# Patient Record
Sex: FEMALE | Race: WHITE | NOT HISPANIC OR LATINO | URBAN - METROPOLITAN AREA
[De-identification: names, ages, dates, MRNs, and addresses within clinical notes are randomized per-mention and may not be internally consistent; named-entity substitution may affect disease eponyms.]

---

## 2018-09-25 ENCOUNTER — INPATIENT (INPATIENT)
Facility: HOSPITAL | Age: 58
LOS: 2 days | Discharge: LEFT BEFORE DISCHARGE | End: 2018-09-28
Attending: INTERNAL MEDICINE | Admitting: INTERNAL MEDICINE

## 2018-09-25 VITALS
HEART RATE: 74 BPM | HEIGHT: 62 IN | WEIGHT: 104.94 LBS | TEMPERATURE: 95 F | RESPIRATION RATE: 14 BRPM | DIASTOLIC BLOOD PRESSURE: 64 MMHG | SYSTOLIC BLOOD PRESSURE: 104 MMHG

## 2018-09-25 DIAGNOSIS — F13.20 SEDATIVE, HYPNOTIC OR ANXIOLYTIC DEPENDENCE, UNCOMPLICATED: ICD-10-CM

## 2018-09-25 DIAGNOSIS — F41.9 ANXIETY DISORDER, UNSPECIFIED: ICD-10-CM

## 2018-09-25 DIAGNOSIS — F17.200 NICOTINE DEPENDENCE, UNSPECIFIED, UNCOMPLICATED: ICD-10-CM

## 2018-09-25 DIAGNOSIS — F19.239 OTHER PSYCHOACTIVE SUBSTANCE DEPENDENCE WITH WITHDRAWAL, UNSPECIFIED: ICD-10-CM

## 2018-09-25 DIAGNOSIS — B19.20 UNSPECIFIED VIRAL HEPATITIS C WITHOUT HEPATIC COMA: ICD-10-CM

## 2018-09-25 DIAGNOSIS — F11.20 OPIOID DEPENDENCE, UNCOMPLICATED: ICD-10-CM

## 2018-09-25 LAB
ALBUMIN SERPL ELPH-MCNC: 4 G/DL — SIGNIFICANT CHANGE UP (ref 3.5–5.2)
ALP SERPL-CCNC: 65 U/L — SIGNIFICANT CHANGE UP (ref 30–115)
ALT FLD-CCNC: 43 U/L — HIGH (ref 0–41)
ANION GAP SERPL CALC-SCNC: 11 MMOL/L — SIGNIFICANT CHANGE UP (ref 7–14)
APPEARANCE UR: CLEAR — SIGNIFICANT CHANGE UP
AST SERPL-CCNC: 40 U/L — SIGNIFICANT CHANGE UP (ref 0–41)
BACTERIA # UR AUTO: ABNORMAL
BASOPHILS # BLD AUTO: 0.03 K/UL — SIGNIFICANT CHANGE UP (ref 0–0.2)
BASOPHILS NFR BLD AUTO: 0.7 % — SIGNIFICANT CHANGE UP (ref 0–1)
BILIRUB SERPL-MCNC: 0.4 MG/DL — SIGNIFICANT CHANGE UP (ref 0.2–1.2)
BILIRUB UR-MCNC: NEGATIVE — SIGNIFICANT CHANGE UP
BUN SERPL-MCNC: 18 MG/DL — SIGNIFICANT CHANGE UP (ref 10–20)
CALCIUM SERPL-MCNC: 8.7 MG/DL — SIGNIFICANT CHANGE UP (ref 8.5–10.1)
CHLORIDE SERPL-SCNC: 108 MMOL/L — SIGNIFICANT CHANGE UP (ref 98–110)
CHOLEST SERPL-MCNC: 95 MG/DL — LOW (ref 100–200)
CO2 SERPL-SCNC: 23 MMOL/L — SIGNIFICANT CHANGE UP (ref 17–32)
COD CRY URNS QL: NEGATIVE — SIGNIFICANT CHANGE UP
COLOR SPEC: YELLOW — SIGNIFICANT CHANGE UP
CREAT SERPL-MCNC: 0.7 MG/DL — SIGNIFICANT CHANGE UP (ref 0.7–1.5)
DIFF PNL FLD: ABNORMAL
EOSINOPHIL # BLD AUTO: 0.08 K/UL — SIGNIFICANT CHANGE UP (ref 0–0.7)
EOSINOPHIL NFR BLD AUTO: 1.9 % — SIGNIFICANT CHANGE UP (ref 0–8)
EPI CELLS # UR: ABNORMAL /HPF
GGT SERPL-CCNC: 19 U/L — SIGNIFICANT CHANGE UP (ref 1–40)
GLUCOSE SERPL-MCNC: 60 MG/DL — LOW (ref 70–99)
GLUCOSE UR QL: NEGATIVE MG/DL — SIGNIFICANT CHANGE UP
GRAN CASTS # UR COMP ASSIST: NEGATIVE — SIGNIFICANT CHANGE UP
HCG UR QL: NEGATIVE — SIGNIFICANT CHANGE UP
HCT VFR BLD CALC: 37.7 % — SIGNIFICANT CHANGE UP (ref 37–47)
HDLC SERPL-MCNC: 32 MG/DL — LOW
HGB BLD-MCNC: 12.3 G/DL — SIGNIFICANT CHANGE UP (ref 12–16)
HYALINE CASTS # UR AUTO: NEGATIVE — SIGNIFICANT CHANGE UP
IMM GRANULOCYTES NFR BLD AUTO: 0.2 % — SIGNIFICANT CHANGE UP (ref 0.1–0.3)
KETONES UR-MCNC: NEGATIVE — SIGNIFICANT CHANGE UP
LEUKOCYTE ESTERASE UR-ACNC: NEGATIVE — SIGNIFICANT CHANGE UP
LIPID PNL WITH DIRECT LDL SERPL: 54 MG/DL — SIGNIFICANT CHANGE UP (ref 4–129)
LYMPHOCYTES # BLD AUTO: 1.67 K/UL — SIGNIFICANT CHANGE UP (ref 1.2–3.4)
LYMPHOCYTES # BLD AUTO: 40.3 % — SIGNIFICANT CHANGE UP (ref 20.5–51.1)
MAGNESIUM SERPL-MCNC: 1.8 MG/DL — SIGNIFICANT CHANGE UP (ref 1.8–2.4)
MCHC RBC-ENTMCNC: 30.6 PG — SIGNIFICANT CHANGE UP (ref 27–31)
MCHC RBC-ENTMCNC: 32.6 G/DL — SIGNIFICANT CHANGE UP (ref 32–37)
MCV RBC AUTO: 93.8 FL — SIGNIFICANT CHANGE UP (ref 81–99)
MONOCYTES # BLD AUTO: 0.3 K/UL — SIGNIFICANT CHANGE UP (ref 0.1–0.6)
MONOCYTES NFR BLD AUTO: 7.2 % — SIGNIFICANT CHANGE UP (ref 1.7–9.3)
NEUTROPHILS # BLD AUTO: 2.05 K/UL — SIGNIFICANT CHANGE UP (ref 1.4–6.5)
NEUTROPHILS NFR BLD AUTO: 49.7 % — SIGNIFICANT CHANGE UP (ref 42.2–75.2)
NITRITE UR-MCNC: NEGATIVE — SIGNIFICANT CHANGE UP
NRBC # BLD: 0 /100 WBCS — SIGNIFICANT CHANGE UP (ref 0–0)
PCP SPEC-MCNC: SIGNIFICANT CHANGE UP
PH UR: 6 — SIGNIFICANT CHANGE UP (ref 5–8)
PLATELET # BLD AUTO: 84 K/UL — LOW (ref 130–400)
POTASSIUM SERPL-MCNC: 4.6 MMOL/L — SIGNIFICANT CHANGE UP (ref 3.5–5)
POTASSIUM SERPL-SCNC: 4.6 MMOL/L — SIGNIFICANT CHANGE UP (ref 3.5–5)
PROT SERPL-MCNC: 6.2 G/DL — SIGNIFICANT CHANGE UP (ref 6–8)
PROT UR-MCNC: NEGATIVE MG/DL — SIGNIFICANT CHANGE UP
RBC # BLD: 4.02 M/UL — LOW (ref 4.2–5.4)
RBC # FLD: 14.6 % — HIGH (ref 11.5–14.5)
RBC CASTS # UR COMP ASSIST: SIGNIFICANT CHANGE UP /HPF
SODIUM SERPL-SCNC: 142 MMOL/L — SIGNIFICANT CHANGE UP (ref 135–146)
SP GR SPEC: 1.02 — SIGNIFICANT CHANGE UP (ref 1.01–1.03)
TOTAL CHOLESTEROL/HDL RATIO MEASUREMENT: 3 RATIO — LOW (ref 4–5.5)
TRI-PHOS CRY UR QL COMP ASSIST: NEGATIVE — SIGNIFICANT CHANGE UP
TRIGL SERPL-MCNC: 49 MG/DL — SIGNIFICANT CHANGE UP (ref 10–149)
URATE CRY FLD QL MICRO: NEGATIVE — SIGNIFICANT CHANGE UP
UROBILINOGEN FLD QL: 0.2 MG/DL — SIGNIFICANT CHANGE UP (ref 0.2–0.2)
WBC # BLD: 4.14 K/UL — LOW (ref 4.8–10.8)
WBC # FLD AUTO: 4.14 K/UL — LOW (ref 4.8–10.8)
WBC UR QL: SIGNIFICANT CHANGE UP /HPF

## 2018-09-25 RX ORDER — HYDROXYZINE HCL 10 MG
100 TABLET ORAL AT BEDTIME
Qty: 0 | Refills: 0 | Status: DISCONTINUED | OUTPATIENT
Start: 2018-09-25 | End: 2018-09-28

## 2018-09-25 RX ORDER — PHENOBARBITAL 60 MG
32.4 TABLET ORAL EVERY 4 HOURS
Qty: 0 | Refills: 0 | Status: DISCONTINUED | OUTPATIENT
Start: 2018-09-25 | End: 2018-09-28

## 2018-09-25 RX ORDER — IBUPROFEN 200 MG
600 TABLET ORAL EVERY 6 HOURS
Qty: 0 | Refills: 0 | Status: DISCONTINUED | OUTPATIENT
Start: 2018-09-25 | End: 2018-09-28

## 2018-09-25 RX ORDER — METHADONE HYDROCHLORIDE 40 MG/1
TABLET ORAL
Qty: 0 | Refills: 0 | Status: COMPLETED | OUTPATIENT
Start: 2018-09-25 | End: 2018-09-28

## 2018-09-25 RX ORDER — PHENOBARBITAL 60 MG
64.8 TABLET ORAL EVERY 6 HOURS
Qty: 0 | Refills: 0 | Status: DISCONTINUED | OUTPATIENT
Start: 2018-09-25 | End: 2018-09-26

## 2018-09-25 RX ORDER — MAGNESIUM HYDROXIDE 400 MG/1
30 TABLET, CHEWABLE ORAL ONCE
Qty: 0 | Refills: 0 | Status: DISCONTINUED | OUTPATIENT
Start: 2018-09-25 | End: 2018-09-28

## 2018-09-25 RX ORDER — PHENOBARBITAL 60 MG
32.4 TABLET ORAL EVERY 12 HOURS
Qty: 0 | Refills: 0 | Status: CANCELLED | OUTPATIENT
Start: 2018-09-29 | End: 2018-09-28

## 2018-09-25 RX ORDER — PHENOBARBITAL 60 MG
TABLET ORAL
Qty: 0 | Refills: 0 | Status: DISCONTINUED | OUTPATIENT
Start: 2018-09-25 | End: 2018-09-28

## 2018-09-25 RX ORDER — PHENOBARBITAL 60 MG
32.4 TABLET ORAL EVERY 6 HOURS
Qty: 0 | Refills: 0 | Status: DISCONTINUED | OUTPATIENT
Start: 2018-09-27 | End: 2018-09-28

## 2018-09-25 RX ORDER — ALPRAZOLAM 0.25 MG
1 TABLET ORAL
Qty: 0 | Refills: 0 | COMMUNITY

## 2018-09-25 RX ORDER — METHOCARBAMOL 500 MG/1
500 TABLET, FILM COATED ORAL EVERY 6 HOURS
Qty: 0 | Refills: 0 | Status: DISCONTINUED | OUTPATIENT
Start: 2018-09-25 | End: 2018-09-28

## 2018-09-25 RX ORDER — PSEUDOEPHEDRINE HCL 30 MG
60 TABLET ORAL EVERY 6 HOURS
Qty: 0 | Refills: 0 | Status: DISCONTINUED | OUTPATIENT
Start: 2018-09-25 | End: 2018-09-28

## 2018-09-25 RX ORDER — ACETAMINOPHEN 500 MG
650 TABLET ORAL EVERY 6 HOURS
Qty: 0 | Refills: 0 | Status: DISCONTINUED | OUTPATIENT
Start: 2018-09-25 | End: 2018-09-28

## 2018-09-25 RX ORDER — HYDROXYZINE HCL 10 MG
50 TABLET ORAL EVERY 6 HOURS
Qty: 0 | Refills: 0 | Status: DISCONTINUED | OUTPATIENT
Start: 2018-09-25 | End: 2018-09-28

## 2018-09-25 RX ORDER — METHADONE HYDROCHLORIDE 40 MG/1
10 TABLET ORAL ONCE
Qty: 0 | Refills: 0 | Status: DISCONTINUED | OUTPATIENT
Start: 2018-09-25 | End: 2018-09-26

## 2018-09-25 RX ORDER — MULTIVIT-MIN/FERROUS GLUCONATE 9 MG/15 ML
1 LIQUID (ML) ORAL DAILY
Qty: 0 | Refills: 0 | Status: DISCONTINUED | OUTPATIENT
Start: 2018-09-25 | End: 2018-09-28

## 2018-09-25 RX ORDER — METHADONE HYDROCHLORIDE 40 MG/1
10 TABLET ORAL EVERY 12 HOURS
Qty: 0 | Refills: 0 | Status: DISCONTINUED | OUTPATIENT
Start: 2018-09-25 | End: 2018-09-25

## 2018-09-25 RX ORDER — NICOTINE POLACRILEX 2 MG
1 GUM BUCCAL DAILY
Qty: 0 | Refills: 0 | Status: DISCONTINUED | OUTPATIENT
Start: 2018-09-25 | End: 2018-09-28

## 2018-09-25 RX ORDER — METHADONE HYDROCHLORIDE 40 MG/1
5 TABLET ORAL EVERY 12 HOURS
Qty: 0 | Refills: 0 | Status: COMPLETED | OUTPATIENT
Start: 2018-09-26 | End: 2018-09-28

## 2018-09-25 RX ORDER — PHENOBARBITAL 60 MG
48.6 TABLET ORAL EVERY 6 HOURS
Qty: 0 | Refills: 0 | Status: DISCONTINUED | OUTPATIENT
Start: 2018-09-26 | End: 2018-09-27

## 2018-09-25 RX ADMIN — Medication 50 MILLIGRAM(S): at 22:20

## 2018-09-25 RX ADMIN — METHADONE HYDROCHLORIDE 10 MILLIGRAM(S): 40 TABLET ORAL at 22:18

## 2018-09-25 NOTE — H&P ADULT - ATTENDING COMMENTS
Patient interviewed and examined.    Chart reviewed.    PA's H&P noted and modified, as appropriate.    Case discussed on team rounds    Following is my summary of the case.    Admitted for detox: from ____ED, _x__Intake, ____Med/Surg Floor    Alcohol____   Opioid___x__  Benzo_x__ Other_____    Substance amount, duration of use, last usage, and prior attempts at detox or rehabs, are outlined above in the H&P and discussed with patient.    Associated withdrawal symptoms presents.  Comorbid conditions noted. Chronic and Stable.    Past Medical Hx, Psych Hx, family Hx, Social Hx from H&P reviewed and NO changes.    Old medical record and medication Hx. Reviewed    Following items reviewed and addressed:  1. labs  2. EKG  3. Imaging from PACs module    Examination: no change from PA's exam.    Place on following protocol  _____Medically Managed  __X__Medically Supervised    Ciwa_____Librium taper____Ativan taper___Methadone taper__x_ Phenobarb taper_x___ Suboxone Induction____MMTP____    Narcan Kit Offered    Psych Consult __X__N/A  ___Ordered    Physical Therapy  ___X N/A   ___  Ordered    Aftercare disposition to be addressed by counselors.    Estimated length of stay 3-5 days.

## 2018-09-25 NOTE — H&P ADULT - NSHPPHYSICALEXAM_GEN_ALL_CORE
-  Vital Signs:      Temp:97.0      Pulse:  78     RR:  14     BP:100/72                    Constitutional: anxious A&Ox3, WD/WN,oversedated  Eyes: PERRLA  Respiratory: +air entry, no rales, no rhonchi, no wheezes  Cardiovascular: +S1 and S2,RRR  Gastrointestinal: +BS, soft, non-tender, not distended, No CVAT  Extremities: no cyanosis, no edema, no calf tenderness,   Vascular: +dorsal pedis and radial pulses, no extremity cyanosis  Neurological: sensation intact, ROM equal B/L, CN II-XII intact, Gait: steady  Skin: no rashes, normal turgor, No track marks   No Decubiti present  No IV lines present  Rectal/Breasts Exam: Deferred

## 2018-09-25 NOTE — H&P ADULT - PROBLEM SELECTOR PLAN 6
Observation  Atarax 50 mg po Q6H  PRN anxiety  Atarax 100 mg po QHS PRN Insomnia  Psych. Consult Prn  D/C Xanax 1 mg po tid

## 2018-09-25 NOTE — H&P ADULT - PROBLEM SELECTOR PLAN 1
Check Urine Toxicology  Phenobarbital Taper Protocol  Monitor VS and withdrawal symptoms  PRN Medications  notify Bailey Brady

## 2018-09-25 NOTE — H&P ADULT - PMH
Anxiety and depression    Drug withdrawal seizure    Hepatitis-C    Nicotine dependence    Opiate dependence, continuous

## 2018-09-25 NOTE — H&P ADULT - ASSESSMENT
59 Y/O white Female with Hx of Continuous Opiate & Benzo Dependency, prior hx of one Detox in 2013,pt currently abusing her Prescribed Xanax,and buying Street Xanax,Sticks. Rigoberto Hensley will be Notified.

## 2018-09-26 LAB
AMPHET UR-MCNC: NEGATIVE — SIGNIFICANT CHANGE UP
BARBITURATES UR SCN-MCNC: NEGATIVE — SIGNIFICANT CHANGE UP
BENZODIAZ UR-MCNC: POSITIVE
COCAINE METAB.OTHER UR-MCNC: NEGATIVE — SIGNIFICANT CHANGE UP
ESTIMATED AVERAGE GLUCOSE: 103 MG/DL — SIGNIFICANT CHANGE UP (ref 68–114)
HAV IGM SER-ACNC: SIGNIFICANT CHANGE UP
HBA1C BLD-MCNC: 5.2 % — SIGNIFICANT CHANGE UP (ref 4–5.6)
HBV CORE IGM SER-ACNC: SIGNIFICANT CHANGE UP
HBV SURFACE AG SER-ACNC: SIGNIFICANT CHANGE UP
HCV AB S/CO SERPL IA: 13.72 S/CO — SIGNIFICANT CHANGE UP
HCV AB SERPL-IMP: REACTIVE
HIV 1+2 AB+HIV1 P24 AG SERPL QL IA: SIGNIFICANT CHANGE UP
METHADONE UR-MCNC: NEGATIVE — SIGNIFICANT CHANGE UP
OPIATES UR-MCNC: POSITIVE
PROPOXYPHENE QUALITATIVE URINE RESULT: NEGATIVE — SIGNIFICANT CHANGE UP
T PALLIDUM AB TITR SER: NEGATIVE — SIGNIFICANT CHANGE UP

## 2018-09-26 RX ADMIN — Medication 600 MILLIGRAM(S): at 12:08

## 2018-09-26 RX ADMIN — Medication 48.6 MILLIGRAM(S): at 17:16

## 2018-09-26 RX ADMIN — Medication 48.6 MILLIGRAM(S): at 06:34

## 2018-09-26 RX ADMIN — METHADONE HYDROCHLORIDE 5 MILLIGRAM(S): 40 TABLET ORAL at 20:42

## 2018-09-26 RX ADMIN — Medication 64.8 MILLIGRAM(S): at 00:20

## 2018-09-26 RX ADMIN — Medication 100 MILLIGRAM(S): at 20:42

## 2018-09-26 RX ADMIN — Medication 50 MILLIGRAM(S): at 17:17

## 2018-09-26 RX ADMIN — Medication 600 MILLIGRAM(S): at 13:27

## 2018-09-26 RX ADMIN — Medication 600 MILLIGRAM(S): at 21:43

## 2018-09-26 RX ADMIN — Medication 48.6 MILLIGRAM(S): at 12:01

## 2018-09-26 RX ADMIN — Medication 600 MILLIGRAM(S): at 20:43

## 2018-09-26 RX ADMIN — METHOCARBAMOL 500 MILLIGRAM(S): 500 TABLET, FILM COATED ORAL at 12:09

## 2018-09-26 RX ADMIN — METHADONE HYDROCHLORIDE 10 MILLIGRAM(S): 40 TABLET ORAL at 12:49

## 2018-09-26 RX ADMIN — Medication 1 TABLET(S): at 09:25

## 2018-09-27 RX ORDER — TUBERCULIN PURIFIED PROTEIN DERIVATIVE 5 [IU]/.1ML
5 INJECTION, SOLUTION INTRADERMAL ONCE
Qty: 0 | Refills: 0 | Status: COMPLETED | OUTPATIENT
Start: 2018-09-27 | End: 2018-09-27

## 2018-09-27 RX ADMIN — Medication 48.6 MILLIGRAM(S): at 00:22

## 2018-09-27 RX ADMIN — Medication 600 MILLIGRAM(S): at 16:13

## 2018-09-27 RX ADMIN — Medication 1 TABLET(S): at 09:45

## 2018-09-27 RX ADMIN — Medication 32.4 MILLIGRAM(S): at 06:15

## 2018-09-27 RX ADMIN — Medication 32.4 MILLIGRAM(S): at 12:31

## 2018-09-27 RX ADMIN — Medication 32.4 MILLIGRAM(S): at 23:58

## 2018-09-27 RX ADMIN — Medication 100 MILLIGRAM(S): at 21:43

## 2018-09-27 RX ADMIN — METHOCARBAMOL 500 MILLIGRAM(S): 500 TABLET, FILM COATED ORAL at 15:38

## 2018-09-27 RX ADMIN — METHADONE HYDROCHLORIDE 5 MILLIGRAM(S): 40 TABLET ORAL at 09:45

## 2018-09-27 RX ADMIN — METHADONE HYDROCHLORIDE 5 MILLIGRAM(S): 40 TABLET ORAL at 21:41

## 2018-09-27 RX ADMIN — Medication 600 MILLIGRAM(S): at 15:36

## 2018-09-27 RX ADMIN — TUBERCULIN PURIFIED PROTEIN DERIVATIVE 5 UNIT(S): 5 INJECTION, SOLUTION INTRADERMAL at 09:45

## 2018-09-27 RX ADMIN — Medication 32.4 MILLIGRAM(S): at 18:01

## 2018-09-27 NOTE — CHART NOTE - NSCHARTNOTEFT_GEN_A_CORE
Subsequent Inpatient Encounter                                       Detox Unit    SHERLY PHILLIPS   58y   Female      Chief Complaint:    Follow up for Polysubstance  Dependency    HPI:     I reviewed previous notes. No Change, except if noted below.             Detail:_    ROS:   I reviewed with patient.  No changes from previous notes except if noted below.             Detail: _    PFSH I reviewed with patient. No changes from previous notes except if noted below.             Detail_    Medication reconciliation performed.    MEDICATIONS  (STANDING):  methadone    Tablet   Oral   methadone    Tablet 5 milliGRAM(s) Oral every 12 hours  multivitamin/minerals 1 Tablet(s) Oral daily  nicotine - 21 mG/24Hr(s) Patch 1 Patch Transdermal daily  PHENobarbital 32.4 milliGRAM(s) Oral every 6 hours  PHENobarbital   Oral       MEDICATIONS  (PRN):  acetaminophen   Tablet .. 650 milliGRAM(s) Oral every 6 hours PRN Temp greater or equal to 38C (100.4F), Mild Pain (1 - 3)  aluminum hydroxide/magnesium hydroxide/simethicone Suspension 30 milliLiter(s) Oral every 6 hours PRN Heartburn  bismuth subsalicylate Liquid 30 milliLiter(s) Oral every 6 hours PRN Diarrhea  cloNIDine 0.1 milliGRAM(s) Oral every 8 hours PRN Blood Pressure GREATER THAN 140/90 mmHG  cloNIDine 0.1 milliGRAM(s) Oral every 8 hours PRN opiate withdrawal  guaiFENesin/dextromethorphan  Syrup 5 milliLiter(s) Oral every 4 hours PRN Cough  hydrOXYzine hydrochloride 50 milliGRAM(s) Oral every 6 hours PRN Anxiety  hydrOXYzine hydrochloride 100 milliGRAM(s) Oral at bedtime PRN insomnia  ibuprofen  Tablet. 600 milliGRAM(s) Oral every 6 hours PRN Mild Pain (1 - 3)  magnesium hydroxide Suspension 30 milliLiter(s) Oral once PRN Constipation  methocarbamol 500 milliGRAM(s) Oral every 6 hours PRN muscle pain  PHENobarbital 32.4 milliGRAM(s) Oral every 4 hours PRN Withdrawal  pseudoephedrine 60 milliGRAM(s) Oral every 6 hours PRN Rhinitis  trimethobenzamide 300 milliGRAM(s) Oral every 6 hours PRN Nausea and/or Vomiting  trimethobenzamide Injectable 200 milliGRAM(s) IntraMuscular every 6 hours PRN Nausea and/or Vomiting      T(C): 36 (18 @ 06:00), Max: 36 (18 @ 06:00)  HR: 57 (18 @ 06:00) (57 - 79)  BP: 124/67 (18 @ 06:00) (118/83 - 139/63)  RR: 14 (18 @ 06:00) (14 - 16)  SpO2: --    PHYSICAL EXAM:      Constitutional: NAD, A&O x3    Eyes: PERRLA, no conjuctivitis    Neck: no lymphadenopathy    Respiratory: +air entry, no rales, no rhonchi, no wheezes    Cardiovascular: +S1 and S2, regular rate and rhythm    Gastrointestinal: +BS, soft, non-tender, not distended    Extremities:  no edema, no calf tenderness    Skin: no rashes, normal turgor                            12.3   4.14  )-----------( 84       ( 25 Sep 2018 15:54 )             37.7       142  |  108  |  18  ----------------------------<  60<L>  4.6   |  23  |  0.7    Ca    8.7      25 Sep 2018 15:54  Mg     1.8         TPro  6.2  /  Alb  4.0  /  TBili  0.4  /  DBili  x   /  AST  40  /  ALT  43<H>  /  AlkPhos  65      Magnesium, Serum: 1.8 mg/dL (18 @ 15:54)  Hemoglobin A1C, Whole Blood: 5.2 % (18 @ 15:54)  Treponema Pallidum Antibody Interpretation: Negative (18 @ 15:54)  Hepatitis B Surface Antigen: Nonreact (18 @ 15:54)  Hepatitis C Virus S/CO Ratio: 13.72 S/CO (18 @ 15:54)    Hepatitis C Virus Interpretation: Reactive (18 @ 15:54)      Urinalysis Basic - ( 25 Sep 2018 16:00 )    Color: Yellow / Appearance: Clear / S.025 / pH: x  Gluc: x / Ketone: Negative  / Bili: Negative / Urobili: 0.2 mg/dL   Blood: x / Protein: Negative mg/dL / Nitrite: Negative   Leuk Esterase: Negative / RBC: 1-2 /HPF / WBC 3-5 /HPF   Sq Epi: x / Non Sq Epi: Moderate /HPF / Bacteria: Few          Impression and Plan:    Primary Diagnosis:  Benzo/Opiate Dependency                                Medication: Pheno/Methadone Protocol    Secondary Diagnosis:                                                                                Medication:    Tertiary Diagnosis:                                                                                     Medication:      Continue Detox Protocols. Use of PRNS as needed for withdrawal and comfort.    Adjustments to protocols:    Labs/ Tests reviewed.  X__Home       ___Rehab       ___Outpatient Program    ___Self Help     _____Other    Estimated Length of stay:_5___

## 2018-09-28 VITALS
SYSTOLIC BLOOD PRESSURE: 159 MMHG | DIASTOLIC BLOOD PRESSURE: 71 MMHG | TEMPERATURE: 96 F | HEART RATE: 69 BPM | RESPIRATION RATE: 16 BRPM

## 2018-09-28 RX ORDER — SALICYLIC ACID 0.5 %
1 CLEANSER (GRAM) TOPICAL EVERY 6 HOURS
Qty: 0 | Refills: 0 | Status: DISCONTINUED | OUTPATIENT
Start: 2018-09-28 | End: 2018-09-28

## 2018-09-28 RX ADMIN — Medication 100 MILLIGRAM(S): at 00:08

## 2018-09-28 NOTE — CHART NOTE - NSCHARTNOTEFT_GEN_A_CORE
The patient was admitted to the inpt detox unit CDU, for   ETOH____ Opioid__x_  Benzo_x___Polysubstance _____ Dependency.    Pt was admitted from ED____, Intake__x__, Med/surg Floor_______.    Details are present in the preceding History & Physical section and follow up chart notes.  patient was evaluated on daily detox team  rounds.  Withdrawal symptoms and signs were reviewed on a daily basis, and the protocols were adjusted accordingly.    Labs and imaging results were reviewed and discussed with the patient.    All questions from the patient were addressed.  The patient was seen by the Chemical dependency counselors, and different options for after care were discussed.  The patient attended groups, meetings and 1:1 sessions with the counselors.  Narcane Kit was offered and instructions given prior to discharge.    Psychiatry consultation reviewed______, N/A__x____    Physical therapy evaluation reviewed_____, N/A_x___    Pt was given copies of labs and imaging reports, if applicable.    Prescriptions if needed, were sent through ER[facility] system to the pharmacy amnd are noted in the discharge instruction sheet.    After care was arranged by counselors and pt was discharged to:    Home___, Outpt. Program___, Rehab ___, Long term____ [facility] ____ IPP____ SNF____, AMA_x__, Admin Discharge____    Principal Diagnosis: Alcohol Dependency____ Opioid Dependency_x__ Benzo Dependency_x___ Polysubstance Dependency____

## 2018-10-02 DIAGNOSIS — F11.29 OPIOID DEPENDENCE WITH UNSPECIFIED OPIOID-INDUCED DISORDER: ICD-10-CM

## 2018-10-02 DIAGNOSIS — F13.29 SEDATIVE, HYPNOTIC OR ANXIOLYTIC DEPENDENCE WITH UNSPECIFIED SEDATIVE, HYPNOTIC OR ANXIOLYTIC-INDUCED DISORDER: ICD-10-CM

## 2018-10-02 DIAGNOSIS — B19.20 UNSPECIFIED VIRAL HEPATITIS C WITHOUT HEPATIC COMA: ICD-10-CM

## 2018-10-02 DIAGNOSIS — F41.9 ANXIETY DISORDER, UNSPECIFIED: ICD-10-CM

## 2018-10-02 DIAGNOSIS — F32.9 MAJOR DEPRESSIVE DISORDER, SINGLE EPISODE, UNSPECIFIED: ICD-10-CM

## 2018-10-02 DIAGNOSIS — Z56.0 UNEMPLOYMENT, UNSPECIFIED: ICD-10-CM

## 2018-10-02 DIAGNOSIS — F17.200 NICOTINE DEPENDENCE, UNSPECIFIED, UNCOMPLICATED: ICD-10-CM

## 2018-10-02 SDOH — ECONOMIC STABILITY - INCOME SECURITY: UNEMPLOYMENT, UNSPECIFIED: Z56.0

## 2020-11-23 NOTE — H&P ADULT - HISTORY OF PRESENT ILLNESS
Advised of USPSTF recommendation for exercise. Goals for diet and exercise established.     57 Y/O white Female with Hx of Continuous Opiate & Benzo Dependency, prior hx of one Detox in ,pt currently abusing her Prescribed Xanax,and buying Street Xanax,Sticks. Rigoberto Hensley will be Notified.    DRUG	AGE OF ONSET (Y.O)	ROUTE	FREQ	AMOUNT	LAST USE	LENGTH OF CURRENT USE	  HEROIN 	26 Y/O	IN	Daily	5-6 Bags	2018	4 Months	  XANAX	29 Y/O	PO	Daily	3-5 Sticks	2018 8 Sticks	4 Months	  Pt denied any other Drugs,Alcohol,or IVDA.							  							  							    Opiate W/D HX: Mylagia,N/V/D,HA,Lacrimation,Piloerction,and Insomnia  Benzo W/D HX:  (+)Seizure 2018, hx of Blackout  OBGYN Hx:     P : 1         LMP: 28 Years PTA    Last Pap-Smear :  (WNL)        Last Mammogram : Never had One  Patient A&Ox3, denies CP, sob,Abdominal pain,blurry Vision, headache, dizziness, bleeding or  dysuria.  Hx of Withdrawal Seizures: Yes         last Seizure: 2018  Psyhx: Depression & Anxiety   Abusing prescribed Xanax              Denies any S/H Ideation or A/V Hallucination  Is patient currently receiving methadone from an MMTP: No  Screening history	Last tested	Result	History of treatment	  HIV		NEG	N/A	  Hepatitis C	2017	(POS)	N/A	  Quantiferon GOLD TB test	2017	NEG	N/A	    Immunization	Not Received	Unknown	Received	Date Received 	  Influenza		v			  Pneumococcus		v			  Tetanus			v	<10 years	  Others					  					  I-Stop:  Patient Name:	Lindy Washburn	YOB: 1960	  Address:	[address] [place] [address]	Sex:	Female	  Rx Written	Rx Dispensed	Drug	Quantity	Days Supply	Prescriber Name	  2018	alprazolam 1 mg tablet	90	30	Rigoberto Barnett MD	  2018	08/10/2018	xanax 1 mg tablet	90	30	Rigoberto Barnett MD	  2018	xanax 1 mg tablet	90	30	Rigoberto Barnett MD

## 2021-02-10 ENCOUNTER — EMERGENCY (EMERGENCY)
Facility: HOSPITAL | Age: 61
LOS: 1 days | Discharge: HOME | End: 2021-02-10
Attending: EMERGENCY MEDICINE
Payer: COMMERCIAL

## 2021-02-10 VITALS
HEART RATE: 66 BPM | TEMPERATURE: 97 F | RESPIRATION RATE: 15 BRPM | SYSTOLIC BLOOD PRESSURE: 107 MMHG | DIASTOLIC BLOOD PRESSURE: 77 MMHG | OXYGEN SATURATION: 96 %

## 2021-02-10 LAB
ALBUMIN SERPL ELPH-MCNC: 3.7 G/DL — SIGNIFICANT CHANGE UP (ref 3.5–5)
ALP SERPL-CCNC: 96 U/L — SIGNIFICANT CHANGE UP (ref 40–120)
ALT FLD-CCNC: 52 U/L DA — SIGNIFICANT CHANGE UP (ref 10–60)
ANION GAP SERPL CALC-SCNC: 5 MMOL/L — SIGNIFICANT CHANGE UP (ref 5–17)
AST SERPL-CCNC: 40 U/L — SIGNIFICANT CHANGE UP (ref 10–40)
BASOPHILS # BLD AUTO: 0.04 K/UL — SIGNIFICANT CHANGE UP (ref 0–0.2)
BASOPHILS NFR BLD AUTO: 0.9 % — SIGNIFICANT CHANGE UP (ref 0–2)
BILIRUB SERPL-MCNC: 0.5 MG/DL — SIGNIFICANT CHANGE UP (ref 0.2–1.2)
BUN SERPL-MCNC: 13 MG/DL — SIGNIFICANT CHANGE UP (ref 7–18)
CALCIUM SERPL-MCNC: 9 MG/DL — SIGNIFICANT CHANGE UP (ref 8.4–10.5)
CHLORIDE SERPL-SCNC: 108 MMOL/L — SIGNIFICANT CHANGE UP (ref 96–108)
CO2 SERPL-SCNC: 28 MMOL/L — SIGNIFICANT CHANGE UP (ref 22–31)
CREAT SERPL-MCNC: 0.74 MG/DL — SIGNIFICANT CHANGE UP (ref 0.5–1.3)
EOSINOPHIL # BLD AUTO: 0.05 K/UL — SIGNIFICANT CHANGE UP (ref 0–0.5)
EOSINOPHIL NFR BLD AUTO: 1.1 % — SIGNIFICANT CHANGE UP (ref 0–6)
ETHANOL SERPL-MCNC: <3 MG/DL — SIGNIFICANT CHANGE UP (ref 0–10)
GLUCOSE SERPL-MCNC: 89 MG/DL — SIGNIFICANT CHANGE UP (ref 70–99)
HCT VFR BLD CALC: 42.3 % — SIGNIFICANT CHANGE UP (ref 34.5–45)
HGB BLD-MCNC: 14.3 G/DL — SIGNIFICANT CHANGE UP (ref 11.5–15.5)
IMM GRANULOCYTES NFR BLD AUTO: 0.2 % — SIGNIFICANT CHANGE UP (ref 0–1.5)
LYMPHOCYTES # BLD AUTO: 1.18 K/UL — SIGNIFICANT CHANGE UP (ref 1–3.3)
LYMPHOCYTES # BLD AUTO: 25.4 % — SIGNIFICANT CHANGE UP (ref 13–44)
MAGNESIUM SERPL-MCNC: 2 MG/DL — SIGNIFICANT CHANGE UP (ref 1.6–2.6)
MCHC RBC-ENTMCNC: 31.4 PG — SIGNIFICANT CHANGE UP (ref 27–34)
MCHC RBC-ENTMCNC: 33.8 GM/DL — SIGNIFICANT CHANGE UP (ref 32–36)
MCV RBC AUTO: 92.8 FL — SIGNIFICANT CHANGE UP (ref 80–100)
MONOCYTES # BLD AUTO: 0.35 K/UL — SIGNIFICANT CHANGE UP (ref 0–0.9)
MONOCYTES NFR BLD AUTO: 7.5 % — SIGNIFICANT CHANGE UP (ref 2–14)
NEUTROPHILS # BLD AUTO: 3.02 K/UL — SIGNIFICANT CHANGE UP (ref 1.8–7.4)
NEUTROPHILS NFR BLD AUTO: 64.9 % — SIGNIFICANT CHANGE UP (ref 43–77)
NRBC # BLD: 0 /100 WBCS — SIGNIFICANT CHANGE UP (ref 0–0)
PHOSPHATE SERPL-MCNC: 3.9 MG/DL — SIGNIFICANT CHANGE UP (ref 2.5–4.5)
PLATELET # BLD AUTO: 88 K/UL — LOW (ref 150–400)
POTASSIUM SERPL-MCNC: 4.3 MMOL/L — SIGNIFICANT CHANGE UP (ref 3.5–5.3)
POTASSIUM SERPL-SCNC: 4.3 MMOL/L — SIGNIFICANT CHANGE UP (ref 3.5–5.3)
PROT SERPL-MCNC: 7.1 G/DL — SIGNIFICANT CHANGE UP (ref 6–8.3)
RBC # BLD: 4.56 M/UL — SIGNIFICANT CHANGE UP (ref 3.8–5.2)
RBC # FLD: 14 % — SIGNIFICANT CHANGE UP (ref 10.3–14.5)
SODIUM SERPL-SCNC: 141 MMOL/L — SIGNIFICANT CHANGE UP (ref 135–145)
WBC # BLD: 4.65 K/UL — SIGNIFICANT CHANGE UP (ref 3.8–10.5)
WBC # FLD AUTO: 4.65 K/UL — SIGNIFICANT CHANGE UP (ref 3.8–10.5)

## 2021-02-10 PROCEDURE — 80053 COMPREHEN METABOLIC PANEL: CPT

## 2021-02-10 PROCEDURE — 84100 ASSAY OF PHOSPHORUS: CPT

## 2021-02-10 PROCEDURE — 70450 CT HEAD/BRAIN W/O DYE: CPT

## 2021-02-10 PROCEDURE — 85025 COMPLETE CBC W/AUTO DIFF WBC: CPT

## 2021-02-10 PROCEDURE — 36415 COLL VENOUS BLD VENIPUNCTURE: CPT

## 2021-02-10 PROCEDURE — 99284 EMERGENCY DEPT VISIT MOD MDM: CPT | Mod: 25

## 2021-02-10 PROCEDURE — 70450 CT HEAD/BRAIN W/O DYE: CPT | Mod: 26

## 2021-02-10 PROCEDURE — 99285 EMERGENCY DEPT VISIT HI MDM: CPT

## 2021-02-10 PROCEDURE — 82962 GLUCOSE BLOOD TEST: CPT

## 2021-02-10 PROCEDURE — 93005 ELECTROCARDIOGRAM TRACING: CPT

## 2021-02-10 PROCEDURE — 80307 DRUG TEST PRSMV CHEM ANLYZR: CPT

## 2021-02-10 PROCEDURE — 83735 ASSAY OF MAGNESIUM: CPT

## 2021-02-10 RX ORDER — SODIUM CHLORIDE 9 MG/ML
1000 INJECTION INTRAMUSCULAR; INTRAVENOUS; SUBCUTANEOUS ONCE
Refills: 0 | Status: COMPLETED | OUTPATIENT
Start: 2021-02-10 | End: 2021-02-10

## 2021-02-10 RX ADMIN — SODIUM CHLORIDE 1000 MILLILITER(S): 9 INJECTION INTRAMUSCULAR; INTRAVENOUS; SUBCUTANEOUS at 22:11

## 2021-02-10 NOTE — ED PROVIDER NOTE - NS ED MD DISPO DISCHARGE
Patient states she and her  are ill with COVID-19. She was seen in the ER yesterday as she was experiencing discomfort in the lungs. EKG was completed, troponin was WNL, CXR was completed, fluids were administered, and patients symptoms improved so she was discharged home with prescription for albuterol inhaler, and Z-Pack. Patient states she and her  had a rough night last night. She states there were both coughing a lot. She states her SpO2 was 87%-88% this morning. She states she put her husbands O2 on and took a nap. She states when she woke her SpO2 was back up to 93%. She states she has not been wearing the O2 and her SpO2 is remaining 92%-93%. She states she still has a fever as well. She states currently her temp is 99.2. She states at night it seems that the fever increases, and the SpO2 decreases. Patient states she does not feel she needs to go to the ER. She states she has be using her inhaler, and she has used her husbands nebulizer (different appliance), and she has use his O2 as needed. She states she is able perform ADL's without difficulty. She comments that she cleaned up the kitchen today without difficulty, but she did feel like she needed to lie down for a nap afterwards. Advised to continue supportive measures, and to go to ER with any worsening of symptoms, as they symptoms can worsen rapidly. Explained that a message would, be sent to PCP and patient would receive a call with any further recommendations. Warning signs discussed. Patient states understanding.    Home

## 2021-02-10 NOTE — ED PROVIDER NOTE - SKIN, MLM
Skin normal color for race, warm, dry and intact. No evidence of rash, ecchymosis, and abrasions all over of the body.

## 2021-02-10 NOTE — ED PROVIDER NOTE - NSFOLLOWUPCLINICS_GEN_ALL_ED_FT
[facility]  [address] [place] [address]  Phone: [phone]  Fax: [phone]  Follow Up Time:

## 2021-02-10 NOTE — ED ADULT TRIAGE NOTE - CHIEF COMPLAINT QUOTE
As per EMS, [place], admits drinking alcohol x today. Pt arousable to sternal rub, on Methadone and Xanax.

## 2021-02-10 NOTE — ED PROVIDER NOTE - PROGRESS NOTE DETAILS
reevaluated, feesl better, more responsive. Admits to have used heroin today. VS stable, RR-15, [id number]%, pupils 5mm reactive. Signed out to Dr Wilhelm, f/u CT scan and close monitoring to clinical sobriety Melonie: ct head negative. now ambulatory. asking to go home. will discharge. f/u with detox. return precautions discussed.

## 2021-02-10 NOTE — ED PROVIDER NOTE - NSFOLLOWUPINSTRUCTIONS_ED_ALL_ED_FT
[facility]                                                                                                            Opioid Use Disorder    Opioid use disorder is a condition in which opioids are used for reasons other than medical care. The person may use them even though taking them hurts the person's health and well-being. These drugs are powerful substances that relieve pain. Opioids include drugs such as heroin as well as prescription medicines for pain, such as:  •Codeine.      •Morphine.       •Hydrocodone.      •Oxycodone.       •Fentanyl.    Taking prescribed opioids regularly can lead to dependence, especially if you take them in larger amounts or more often than they should be taken. Opioid use disorder can lead to problems with mental and physical health, including:  •Depression or anxiety.       •Severe constipation.       •Malnutrition and weight loss.       •Sleep problems.       •Diseases caused by infections, such as hepatitis or HIV.      •Sexual problems.      Opioid use disorder can be dangerous. It increases the risk of suicide and can lead to an overdose that can be life-threatening.      What are the causes?    This condition is caused by taking opioids. Taking opioids again and again results in changes in the brain that make it hard to control opioid use. Many people develop this condition because they like the way they feel when they take opioids or because they get addicted to them.      What increases the risk?  This condition is more likely to develop in people who:  •Have a family history of opioid use disorder.      •Misuse other drugs.      •Have a mental illness, such as depression, post-traumatic stress disorder, or antisocial personality disorder.      •Begin use at an early age, such as during their teenage years.        What are the signs or symptoms?  Symptoms of this condition include:  •Taking opioids in larger amounts or for longer periods than you want to.      •Being unable to slow down or stop your use of the drug.      •Spending an abnormal amount of time getting opioids, using them, or recovering from their effects.      •Craving opioids.      •Using opioids in a way that interferes with work, school, social activities, and personal relationships.      •Giving up or cutting down on important life activities because of opioid use.      •Using opioids when it is dangerous, such as when driving a car.    •Continuing to use the drug even after it has led to problems such as:  •Physical or mental health problems.       •Legal or financial troubles.       •Job loss.       •Broken relationships.        •Needing more and more of an opioid to get the same effect (building up a tolerance).    •Experiencing unpleasant symptoms if you do not use the opioid (withdrawal). Some symptoms of withdrawal include:  •Depression, anxiety, or feeling irritable.      •Nausea or vomiting.      •Muscle aches or spasms.      •Watery eyes.      •Trouble sleeping.      •Yawning.          How is this diagnosed?  This condition is diagnosed based on:  •A physical exam.       •Your history of opioid use.     •Your symptoms. This includes:   •How opioid use affects your life.      •Changes in personality, behaviors, and mood.      •Having at least two symptoms of opioid use disorder within a 12-month period.      •Health issues related to using opioids.        •Blood or urine tests to screen for drugs.        How is this treated?  The first goal of treatment is to stop your use of opioids. This must be done safely and may involve taking medicines to lessen withdrawal symptoms. Treatment may also involve:  •Taking part in group and individual counseling from mental health providers who have experience with substance use disorder.       •Staying at a residential treatment center for several days or weeks.       •Attending daily counseling sessions at a treatment center.     •Taking medicines as told by your health care provider that:   •Ease symptoms and prevent complications during withdrawal.       •Block cravings and block the good feeling that you get from using opioids.       •Treat other mental health issues, such as depression or anxiety.       •Reduce agitation.         •Participating in a support group to share your experience with others who are going through the same thing.       •Using opioid maintenance treatment. This involves taking certain kinds of opioid medicines. These medicines satisfy cravings but are safer than opioids that are commonly misused.      Recovery can be a long process. Some people who undergo treatment start using opioids again after stopping (relapse). If you relapse, it does not mean that treatment will not work.      Follow these instructions at home:    Medicines     •Take over-the-counter and prescription medicines only as told by your health care provider.      •Check with your health care provider before starting any new medicines, herbs, or supplements.      General instructions     • Do not use any drugs or alcohol.      •Avoid people and activities that trigger your use of opioids.       •Learn and practice techniques for managing stress.       •Have a plan for vulnerable moments. Get phone numbers of those who are willing to help and who are committed to your recovery.       •Attend support groups regularly. These groups provide emotional support, advice, and guidance.       •Keep all follow-up visits as told by your health care provider. This is important. This includes continuing to work with therapists and support groups.        Where to find more information    •National [place] on Drug Abuse: drugabuse.gov      •Substance Abuse and Mental Health Services Administration: samhsa.gov      •Narcotics Anonymous: na.org        Contact a health care provider if:    •You cannot take your medicines as told.      •Your symptoms get worse.      •You have a relapse.        Get help right away if:  •You may have taken too much of an opioid (overdosed). Common symptoms of an overdose include:  •Sleepiness or difficulty waking from sleep.       •Decrease in attention.       •Confusion.       •Slurred speech.       •Slowed breathing and a slow pulse (bradycardia).       •Nausea and vomiting.       •Abnormally small pupils.        •You have serious thoughts about hurting yourself or others.      These symptoms may represent a serious problem that is an emergency. Do not wait to see if the symptoms will go away. Get medical help right away. Call your local emergency services (521 in the [place] Do not drive yourself to the hospital.     If you were prescribed a drug (naloxone) that reverses the effects of an opioid overdose, a friend, family member, or emergency services provider can administer the drug in an emergency.   If you ever feel like you may hurt yourself or others, or have thoughts about taking your own life, get help right away. You can go to your nearest emergency department or call:   • Your local emergency services (381 in the [place]       • A suicide crisis helpline, such as the National Suicide Prevention Lifeline at 1-135.803.3663. This is open 24 hours a day.         Summary    •Opioid use disorder is a condition in which opioids are used for reasons other than medical care.      •Opioid use disorder can be dangerous. It can lead to various mental and physical problems, and an opioid overdose can be life-threatening.      •The first goal of treatment is to stop your use of opioids. This must be done safely and may involve taking medicines to lessen withdrawal symptoms.      This information is not intended to replace advice given to you by your health care provider. Make sure you discuss any questions you have with your health care provider.      Document Revised: 05/20/2020 Document Reviewed: 05/20/2020    Elsevier Patient Education © 2020 [employer]

## 2021-02-10 NOTE — ED PROVIDER NOTE - PATIENT PORTAL LINK FT
You can access the FollowMyHealth Patient Portal offered by [facility] by registering at the following website: [email or web address]. By joining [facility]’s FollowMyHealth portal, you will also be able to view your health information using other applications (apps) compatible with our system.

## 2021-02-10 NOTE — ED ADULT NURSE NOTE - TEMPLATE
----- Message from Dasha Garay sent at 12/19/2018  2:15 PM EST -----  Contact: DR BENITO  THE ANDROGEL IS NOT GOING TO BE COVERED AFTER THE FIRST OF THE YEAR. WHAT CAN HE SWITCH IT TO?  [phone]  
Informed pt he needed to call his insurance and check to see what the preferred formulary agent will be for the upcoming year and then let us know.  
There are several options - does he know or can we help determine alternative?  
Toxicology

## 2021-02-10 NOTE — ED PROVIDER NOTE - OBJECTIVE STATEMENT
59 y/o F pt with unknown PMHx and PSHx presents to the ED BIB EMS for AMS. As per EMS, patient has a history of alcohol abuse and methadone use. [place] and with AMS. Patient is minimally responsive and unable to provide history. No other information available

## 2021-02-11 VITALS
TEMPERATURE: 97 F | HEART RATE: 83 BPM | OXYGEN SATURATION: 97 % | RESPIRATION RATE: 17 BRPM | DIASTOLIC BLOOD PRESSURE: 66 MMHG | SYSTOLIC BLOOD PRESSURE: 108 MMHG

## 2021-02-13 PROBLEM — F19.239 OTHER PSYCHOACTIVE SUBSTANCE DEPENDENCE WITH WITHDRAWAL, UNSPECIFIED: Chronic | Status: ACTIVE | Noted: 2018-09-25

## 2021-02-13 PROBLEM — B19.20 UNSPECIFIED VIRAL HEPATITIS C WITHOUT HEPATIC COMA: Chronic | Status: ACTIVE | Noted: 2018-09-25

## 2021-02-13 PROBLEM — F17.200 NICOTINE DEPENDENCE, UNSPECIFIED, UNCOMPLICATED: Chronic | Status: ACTIVE | Noted: 2018-09-25

## 2021-02-13 PROBLEM — F11.20 OPIOID DEPENDENCE, UNCOMPLICATED: Chronic | Status: ACTIVE | Noted: 2018-09-25

## 2021-02-13 PROBLEM — F41.9 ANXIETY DISORDER, UNSPECIFIED: Chronic | Status: ACTIVE | Noted: 2018-09-25

## 2024-12-13 NOTE — ED PROVIDER NOTE - INTERNATIONAL TRAVEL
Chief Complaint   Patient presents with    Back Pain     Upper/Mid/Low    Office Visit     Date of Injury: 2024  Initial Treatment Date: 2024  Date Last Seen:12/10/2024  Mechanism of Onset: pregnancy   Occupation:    Referred by: Juarez Bryant DC  Primary Care Physician: Bentley Guerra No